# Patient Record
(demographics unavailable — no encounter records)

---

## 2020-10-23 NOTE — RAD
LUMBAR SPINE 2 VIEWS:

 

Date:  10/23/2020

 

HISTORY:  

Low back pain. 

 

FINDINGS:

Lumbar vertebra maintain normal height and alignment. Mild loss of disc height at L4-5 and L5-S1. Mil
d facet hypertrophy at L3-4, L4-5, and L5-S1. No evidence of spondylolisthesis. Minimal osteophytes f
rom the lumbar vertebra. 

 

IMPRESSION: 

Mild degenerative change as described. 

 

 

POS: OFF